# Patient Record
Sex: FEMALE | Race: WHITE | Employment: OTHER | URBAN - METROPOLITAN AREA
[De-identification: names, ages, dates, MRNs, and addresses within clinical notes are randomized per-mention and may not be internally consistent; named-entity substitution may affect disease eponyms.]

---

## 2017-07-13 ENCOUNTER — APPOINTMENT (OUTPATIENT)
Dept: GENERAL RADIOLOGY | Age: 67
End: 2017-07-13
Attending: EMERGENCY MEDICINE
Payer: MEDICARE

## 2017-07-13 ENCOUNTER — HOSPITAL ENCOUNTER (EMERGENCY)
Age: 67
Discharge: HOME | End: 2017-07-13
Attending: EMERGENCY MEDICINE
Payer: MEDICARE

## 2017-07-13 VITALS
HEART RATE: 59 BPM | BODY MASS INDEX: 25.83 KG/M2 | OXYGEN SATURATION: 98 % | HEIGHT: 68 IN | DIASTOLIC BLOOD PRESSURE: 71 MMHG | TEMPERATURE: 97.7 F | RESPIRATION RATE: 13 BRPM | WEIGHT: 170.42 LBS | SYSTOLIC BLOOD PRESSURE: 131 MMHG

## 2017-07-13 DIAGNOSIS — R07.89 ATYPICAL CHEST PAIN: Primary | ICD-10-CM

## 2017-07-13 LAB
ALBUMIN SERPL BCP-MCNC: 3.4 G/DL (ref 3.5–5)
ALBUMIN/GLOB SERPL: 1 {RATIO} (ref 1.1–2.2)
ALP SERPL-CCNC: 85 U/L (ref 45–117)
ALT SERPL-CCNC: 23 U/L (ref 12–78)
ANION GAP BLD CALC-SCNC: 4 MMOL/L (ref 5–15)
AST SERPL W P-5'-P-CCNC: 19 U/L (ref 15–37)
ATRIAL RATE: 64 BPM
BASOPHILS # BLD AUTO: 0 K/UL (ref 0–0.1)
BASOPHILS # BLD: 0 % (ref 0–1)
BILIRUB SERPL-MCNC: 0.6 MG/DL (ref 0.2–1)
BUN SERPL-MCNC: 21 MG/DL (ref 6–20)
BUN/CREAT SERPL: 31 (ref 12–20)
CALCIUM SERPL-MCNC: 8.6 MG/DL (ref 8.5–10.1)
CALCULATED P AXIS, ECG09: 67 DEGREES
CALCULATED R AXIS, ECG10: 23 DEGREES
CALCULATED T AXIS, ECG11: 57 DEGREES
CHLORIDE SERPL-SCNC: 107 MMOL/L (ref 97–108)
CK SERPL-CCNC: 107 U/L (ref 26–192)
CO2 SERPL-SCNC: 28 MMOL/L (ref 21–32)
CREAT SERPL-MCNC: 0.68 MG/DL (ref 0.55–1.02)
DIAGNOSIS, 93000: NORMAL
EOSINOPHIL # BLD: 0.1 K/UL (ref 0–0.4)
EOSINOPHIL NFR BLD: 3 % (ref 0–7)
ERYTHROCYTE [DISTWIDTH] IN BLOOD BY AUTOMATED COUNT: 13 % (ref 11.5–14.5)
GLOBULIN SER CALC-MCNC: 3.4 G/DL (ref 2–4)
GLUCOSE SERPL-MCNC: 92 MG/DL (ref 65–100)
HCT VFR BLD AUTO: 36.9 % (ref 35–47)
HGB BLD-MCNC: 12.7 G/DL (ref 11.5–16)
INR PPP: 1 (ref 0.9–1.1)
LYMPHOCYTES # BLD AUTO: 26 % (ref 12–49)
LYMPHOCYTES # BLD: 1.2 K/UL (ref 0.8–3.5)
MCH RBC QN AUTO: 30 PG (ref 26–34)
MCHC RBC AUTO-ENTMCNC: 34.4 G/DL (ref 30–36.5)
MCV RBC AUTO: 87.2 FL (ref 80–99)
MONOCYTES # BLD: 0.5 K/UL (ref 0–1)
MONOCYTES NFR BLD AUTO: 11 % (ref 5–13)
NEUTS SEG # BLD: 2.8 K/UL (ref 1.8–8)
NEUTS SEG NFR BLD AUTO: 60 % (ref 32–75)
P-R INTERVAL, ECG05: 226 MS
PLATELET # BLD AUTO: 170 K/UL (ref 150–400)
POTASSIUM SERPL-SCNC: 3.7 MMOL/L (ref 3.5–5.1)
PROT SERPL-MCNC: 6.8 G/DL (ref 6.4–8.2)
PROTHROMBIN TIME: 10.5 SEC (ref 9–11.1)
Q-T INTERVAL, ECG07: 418 MS
QRS DURATION, ECG06: 98 MS
QTC CALCULATION (BEZET), ECG08: 431 MS
RBC # BLD AUTO: 4.23 M/UL (ref 3.8–5.2)
SODIUM SERPL-SCNC: 139 MMOL/L (ref 136–145)
TROPONIN I SERPL-MCNC: <0.04 NG/ML
VENTRICULAR RATE, ECG03: 64 BPM
WBC # BLD AUTO: 4.6 K/UL (ref 3.6–11)

## 2017-07-13 PROCEDURE — 74011250637 HC RX REV CODE- 250/637: Performed by: EMERGENCY MEDICINE

## 2017-07-13 PROCEDURE — 93005 ELECTROCARDIOGRAM TRACING: CPT

## 2017-07-13 PROCEDURE — 80053 COMPREHEN METABOLIC PANEL: CPT | Performed by: EMERGENCY MEDICINE

## 2017-07-13 PROCEDURE — 71010 XR CHEST PORT: CPT

## 2017-07-13 PROCEDURE — 85025 COMPLETE CBC W/AUTO DIFF WBC: CPT | Performed by: EMERGENCY MEDICINE

## 2017-07-13 PROCEDURE — 84484 ASSAY OF TROPONIN QUANT: CPT | Performed by: EMERGENCY MEDICINE

## 2017-07-13 PROCEDURE — 85610 PROTHROMBIN TIME: CPT | Performed by: EMERGENCY MEDICINE

## 2017-07-13 PROCEDURE — 36415 COLL VENOUS BLD VENIPUNCTURE: CPT | Performed by: EMERGENCY MEDICINE

## 2017-07-13 PROCEDURE — 99285 EMERGENCY DEPT VISIT HI MDM: CPT

## 2017-07-13 PROCEDURE — 82550 ASSAY OF CK (CPK): CPT | Performed by: EMERGENCY MEDICINE

## 2017-07-13 RX ORDER — ASPIRIN 325 MG
325 TABLET ORAL ONCE
Status: COMPLETED | OUTPATIENT
Start: 2017-07-13 | End: 2017-07-13

## 2017-07-13 RX ADMIN — ASPIRIN 325 MG ORAL TABLET 325 MG: 325 PILL ORAL at 08:25

## 2017-07-13 NOTE — ED NOTES
Bedside shift change report given to Rachel (oncoming nurse) by Kellee Whyte RN (offgoing nurse). Report included the following information ED Summary. Pt resting with daughter at bedside. Pain now at left upper arm, 2/10. Remains on EKG monitor.

## 2017-07-13 NOTE — DISCHARGE INSTRUCTIONS
Chest Pain: Care Instructions  Your Care Instructions  There are many things that can cause chest pain. Some are not serious and will get better on their own in a few days. But some kinds of chest pain need more testing and treatment. Your doctor may have recommended a follow-up visit in the next 8 to 12 hours. If you are not getting better, you may need more tests or treatment. Even though your doctor has released you, you still need to watch for any problems. The doctor carefully checked you, but sometimes problems can develop later. If you have new symptoms or if your symptoms do not get better, get medical care right away. If you have worse or different chest pain or pressure that lasts more than 5 minutes or you passed out (lost consciousness), call 911 or seek other emergency help right away. A medical visit is only one step in your treatment. Even if you feel better, you still need to do what your doctor recommends, such as going to all suggested follow-up appointments and taking medicines exactly as directed. This will help you recover and help prevent future problems. How can you care for yourself at home? · Rest until you feel better. · Take your medicine exactly as prescribed. Call your doctor if you think you are having a problem with your medicine. · Do not drive after taking a prescription pain medicine. When should you call for help? Call 911 if:  · You passed out (lost consciousness). · You have severe difficulty breathing. · You have symptoms of a heart attack. These may include:  ¨ Chest pain or pressure, or a strange feeling in your chest.  ¨ Sweating. ¨ Shortness of breath. ¨ Nausea or vomiting. ¨ Pain, pressure, or a strange feeling in your back, neck, jaw, or upper belly or in one or both shoulders or arms. ¨ Lightheadedness or sudden weakness. ¨ A fast or irregular heartbeat.   After you call 911, the  may tell you to chew 1 adult-strength or 2 to 4 low-dose aspirin. Wait for an ambulance. Do not try to drive yourself. Call your doctor today if:  · You have any trouble breathing. · Your chest pain gets worse. · You are dizzy or lightheaded, or you feel like you may faint. · You are not getting better as expected. · You are having new or different chest pain. Where can you learn more? Go to http://norma-princess.info/. Enter A120 in the search box to learn more about \"Chest Pain: Care Instructions. \"  Current as of: March 20, 2017  Content Version: 11.3  © 4418-4416 [employer]. Care instructions adapted under license by [employer] (which disclaims liability or warranty for this information). If you have questions about a medical condition or this instruction, always ask your healthcare professional. [address] any warranty or liability for your use of this information.

## 2017-07-13 NOTE — ROUTINE PROCESS
Dr Delia Myers reviewed discharge instructions with the patient. The patient verbalized understanding. Daughter at bedside. Pt alert and stable for discharge.

## 2017-07-13 NOTE — ED PROVIDER NOTES
HPI Comments: Karolyn Shelley is a 75 y.o. female with no pertinent PMHx, who presents ambulatory to ED  c/o moderate aching left sided chest pain that onset at 4:30 AM today, along with associated numbness and discomfort in her left arm and left shoulder. Pt states the symptoms have subsided slightly, but she has not taken any Aspirin or any other medication for them today. She adds that the symptoms are not exacerbated by exertion. Pt additionally reports increased flatulance since eating brocolli for dinner last night, which she has had in the past. She denies any associated diaphoresis, fever, chills, N/V/D, or SOB. PCP:  Manny Mcwilliams. Lauren Carlson MD    Social Hx:  tobacco use (-), EtOH use (-)    There are no other complaints, changes or physical findings at this time. The history is provided by the patient. History reviewed. No pertinent past medical history. Past Surgical History:   Procedure Laterality Date    ABDOMEN SURGERY PROC UNLISTED      Pre cancerous polyp removal in     HX GYN       x2. History reviewed. No pertinent family history. Social History     Social History    Marital status: SINGLE     Spouse name: N/A    Number of children: N/A    Years of education: N/A     Occupational History    Not on file. Social History Main Topics    Smoking status: Never Smoker    Smokeless tobacco: Never Used    Alcohol use Yes    Drug use: Not on file    Sexual activity: Not on file     Other Topics Concern    Not on file     Social History Narrative    No narrative on file         ALLERGIES: Review of patient's allergies indicates no known allergies. Review of Systems   Constitutional: Negative. Negative for activity change, appetite change, chills, fatigue, fever and unexpected weight change. HENT: Negative. Negative for congestion, ear discharge, hearing loss, rhinorrhea, sneezing and voice change. Eyes: Negative.   Negative for pain and visual disturbance. Respiratory: Positive for chest tightness. Negative for apnea, cough, choking and shortness of breath. Cardiovascular: Positive for chest pain. Negative for palpitations. Gastrointestinal: Negative. Negative for abdominal distention, abdominal pain, blood in stool, diarrhea, nausea and vomiting. Genitourinary: Negative. Negative for difficulty urinating, flank pain, frequency and urgency. No discharge   Musculoskeletal: Negative. Negative for arthralgias, back pain, myalgias and neck stiffness. Skin: Negative. Negative for color change and rash. Neurological: Positive for numbness. Negative for dizziness, seizures, syncope, speech difficulty, weakness and headaches. Hematological: Negative for adenopathy. Psychiatric/Behavioral: Negative. Negative for agitation, behavioral problems, dysphoric mood and suicidal ideas. The patient is not nervous/anxious. Vitals:    07/13/17 0745 07/13/17 0800 07/13/17 0815 07/13/17 0830   BP: 110/60 125/64 128/69 131/71   Pulse: 61 61 60 (!) 59   Resp: 17 16 13 13   Temp:       SpO2: 98% 98% 99% 98%   Weight:       Height:                Physical Exam   Constitutional: She is oriented to person, place, and time. She appears well-developed and well-nourished. No distress. HENT:   Head: Normocephalic and atraumatic. Mouth/Throat: Oropharynx is clear and moist. No oropharyngeal exudate. Eyes: Conjunctivae and EOM are normal. Pupils are equal, round, and reactive to light. Right eye exhibits no discharge. Left eye exhibits no discharge. Neck: Normal range of motion. Neck supple. Cardiovascular: Normal rate, regular rhythm and intact distal pulses. Exam reveals no gallop and no friction rub. No murmur heard. Pulmonary/Chest: Effort normal and breath sounds normal. No respiratory distress. She has no wheezes. She has no rales. She exhibits no tenderness. Abdominal: Soft.  Bowel sounds are normal. She exhibits no distension and no mass. There is no tenderness. There is no rebound and no guarding. Musculoskeletal: Normal range of motion. She exhibits no edema. Lymphadenopathy:     She has no cervical adenopathy. Neurological: She is alert and oriented to person, place, and time. No cranial nerve deficit. Coordination normal.   Skin: Skin is warm and dry. No rash noted. No erythema. Psychiatric: She has a normal mood and affect. Nursing note and vitals reviewed. MDM  Number of Diagnoses or Management Options  Atypical chest pain:   Diagnosis management comments: DDx: Musculoskeletal, Arrhythmia, ACS, will assess with basic cardiac labs, EKG, and Chest X-Ray       Amount and/or Complexity of Data Reviewed  Clinical lab tests: reviewed and ordered  Tests in the radiology section of CPT®: reviewed and ordered  Tests in the medicine section of CPT®: ordered and reviewed  Review and summarize past medical records: yes  Independent visualization of images, tracings, or specimens: yes    Patient Progress  Patient progress: stable    ED Course       Procedures      Chief Complaint   Patient presents with    Chest Pain     to left side of chest & into left arm & behind left shoulder x couple of hours       7:15 AM  The patients presenting problems have been discussed, and they are in agreement with the care plan formulated and outlined with them. I have encouraged them to ask questions as they arise throughout their visit.     MEDICATIONS GIVEN:  Medications   aspirin (ASPIRIN) tablet 325 mg (325 mg Oral Given 7/13/17 0825)       LABS REVIEWED:  Recent Results (from the past 24 hour(s))   EKG, 12 LEAD, INITIAL    Collection Time: 07/13/17  6:54 AM   Result Value Ref Range    Ventricular Rate 64 BPM    Atrial Rate 64 BPM    P-R Interval 226 ms    QRS Duration 98 ms    Q-T Interval 418 ms    QTC Calculation (Bezet) 431 ms    Calculated P Axis 67 degrees    Calculated R Axis 23 degrees    Calculated T Axis 57 degrees    Diagnosis Sinus rhythm with 1st degree AV block  Anterior infarct , age undetermined  Abnormal ECG  No previous ECGs available     CBC WITH AUTOMATED DIFF    Collection Time: 07/13/17  7:41 AM   Result Value Ref Range    WBC 4.6 3.6 - 11.0 K/uL    RBC 4.23 3.80 - 5.20 M/uL    HGB 12.7 11.5 - 16.0 g/dL    HCT 36.9 35.0 - 47.0 %    MCV 87.2 80.0 - 99.0 FL    MCH 30.0 26.0 - 34.0 PG    MCHC 34.4 30.0 - 36.5 g/dL    RDW 13.0 11.5 - 14.5 %    PLATELET 929 548 - 536 K/uL    NEUTROPHILS 60 32 - 75 %    LYMPHOCYTES 26 12 - 49 %    MONOCYTES 11 5 - 13 %    EOSINOPHILS 3 0 - 7 %    BASOPHILS 0 0 - 1 %    ABS. NEUTROPHILS 2.8 1.8 - 8.0 K/UL    ABS. LYMPHOCYTES 1.2 0.8 - 3.5 K/UL    ABS. MONOCYTES 0.5 0.0 - 1.0 K/UL    ABS. EOSINOPHILS 0.1 0.0 - 0.4 K/UL    ABS. BASOPHILS 0.0 0.0 - 0.1 K/UL   METABOLIC PANEL, COMPREHENSIVE    Collection Time: 07/13/17  7:41 AM   Result Value Ref Range    Sodium 139 136 - 145 mmol/L    Potassium 3.7 3.5 - 5.1 mmol/L    Chloride 107 97 - 108 mmol/L    CO2 28 21 - 32 mmol/L    Anion gap 4 (L) 5 - 15 mmol/L    Glucose 92 65 - 100 mg/dL    BUN 21 (H) 6 - 20 MG/DL    Creatinine 0.68 0.55 - 1.02 MG/DL    BUN/Creatinine ratio 31 (H) 12 - 20      GFR est AA >60 >60 ml/min/1.73m2    GFR est non-AA >60 >60 ml/min/1.73m2    Calcium 8.6 8.5 - 10.1 MG/DL    Bilirubin, total 0.6 0.2 - 1.0 MG/DL    ALT (SGPT) 23 12 - 78 U/L    AST (SGOT) 19 15 - 37 U/L    Alk.  phosphatase 85 45 - 117 U/L    Protein, total 6.8 6.4 - 8.2 g/dL    Albumin 3.4 (L) 3.5 - 5.0 g/dL    Globulin 3.4 2.0 - 4.0 g/dL    A-G Ratio 1.0 (L) 1.1 - 2.2     CK W/ REFLX CKMB    Collection Time: 07/13/17  7:41 AM   Result Value Ref Range     26 - 192 U/L   TROPONIN I    Collection Time: 07/13/17  7:41 AM   Result Value Ref Range    Troponin-I, Qt. <0.04 <0.05 ng/mL   PROTHROMBIN TIME + INR    Collection Time: 07/13/17  7:41 AM   Result Value Ref Range    INR 1.0 0.9 - 1.1      Prothrombin time 10.5 9.0 - 11.1 sec       VITAL SIGNS:  Patient Vitals for the past 12 hrs:   Temp Pulse Resp BP SpO2   07/13/17 0830 - (!) 59 13 131/71 98 %   07/13/17 0815 - 60 13 128/69 99 %   07/13/17 0800 - 61 16 125/64 98 %   07/13/17 0745 - 61 17 110/60 98 %   07/13/17 0730 - 61 17 109/62 98 %   07/13/17 0715 - 60 12 117/82 97 %   07/13/17 0710 - 65 17 118/72 -   07/13/17 0701 97.7 °F (36.5 °C) (!) 58 16 109/59 99 %       RADIOLOGY RESULTS:  The following have been ordered and reviewed:  CXR Results  (Last 48 hours)               07/13/17 0731  XR CHEST PORT Final result    Impression:  IMPRESSION: No acute abnormality identified               Narrative:  EXAM:  XR CHEST PORT       INDICATION:  chest pain       COMPARISON:  None. FINDINGS: A portable AP radiograph of the chest was obtained at 720 hours. The   patient is on a cardiac monitor. The lungs are clear. The cardiac and   mediastinal contours and pulmonary vascularity are normal.  Bony structures are   intact. EKG interpretation: (Preliminary) 6:54 AM  Rhythm: sinus rhythm with 1st degree AV block . Rate (approx.): 64; Axis: normal; P wave: normal; QRS interval: normal ; ST/T wave: normal;         PROGRESS NOTES:    8:21 AM  I have just reevaluated the patient. I have reviewed Her vital signs and determined there is currently no worsening in their condition or physical exam.  Results have been reviewed with them and their questions have been answered. We will continue to review further results as they come available. 8:29 AM  The patient states that their symptoms have resolved and they feel much better. There are no other new complaints at this time. Her questions have been answered. We are awaiting final results and those will be reviewed with them when they become available. DIAGNOSIS:    1.  Atypical chest pain        PLAN:  Follow-up Information     Follow up With Details Comments Contact Info    Huang Russell MD Call in 2 days As needed, If symptoms worsen [facility] [address] [facility]          Current Discharge Medication List            ED COURSE: The patients hospital course has been uncomplicated. 8:54 AM  Piedad Lino  results have been reviewed with her. She has been counseled regarding her diagnosis. She verbally conveys understanding and agreement of the signs, symptoms, diagnosis, treatment and prognosis and additionally agrees to follow up as recommended with Dr. Sanjuana Boston. Lacey Siddiqui MD in 24 - 48 hours. She also agrees with the care-plan and conveys that all of her questions have been answered. I have also put together some discharge instructions for her that include: 1) educational information regarding their diagnosis, 2) how to care for their diagnosis at home, as well a 3) list of reasons why they would want to return to the ED prior to their follow-up appointment, should their condition change. Attestations: This note is prepared by Deborah Wang. José Manuel Quiles, acting as Scribe for [facility]. Solange Johnson, [address] [facility] Solange Johnson MD: The scribe's documentation has been prepared under my direction and personally reviewed by me in its entirety. I confirm that the note above accurately reflects all work, treatment, procedures, and medical decision making performed by me.

## 2017-07-13 NOTE — ED NOTES
Assumed care of patient from triage. Patient reports left upper chest and shoulder pain beginning around 0430 this morning. Patient states pain is \"difficult to describe but just doesn't feel right. \" Pain denies any SOB or increased pain with exertion. Patient denies any nausea/vomiting as well. Patient states the pain \"could have just been gas because I had broccoli last night but I am not sure. \" Patient also complains of tingling in her left arm/fingers. Patient alert and oriented x4, respirations even and unlabored, vital signs stable. Monitor x3, patient changed into gown, call bell within reach.

## 2017-07-13 NOTE — ED NOTES
Bedside and Verbal shift change report given to Josetta Klinefelter, RN (oncoming nurse). Report included the following information: SBAR, ED Summary, MAR and Recent Results.